# Patient Record
Sex: FEMALE | Race: BLACK OR AFRICAN AMERICAN | NOT HISPANIC OR LATINO
[De-identification: names, ages, dates, MRNs, and addresses within clinical notes are randomized per-mention and may not be internally consistent; named-entity substitution may affect disease eponyms.]

---

## 2017-08-24 PROBLEM — Z00.00 ENCOUNTER FOR PREVENTIVE HEALTH EXAMINATION: Status: ACTIVE | Noted: 2017-08-24

## 2017-08-29 PROBLEM — Z87.39 HISTORY OF OSTEOPOROSIS: Status: RESOLVED | Noted: 2017-08-29 | Resolved: 2017-08-29

## 2017-08-29 PROBLEM — Z86.79 HISTORY OF HYPERTENSION: Status: RESOLVED | Noted: 2017-08-29 | Resolved: 2017-08-29

## 2017-08-29 RX ORDER — MULTIVITAMIN
CAPSULE ORAL
Refills: 0 | Status: ACTIVE | COMMUNITY

## 2017-08-29 RX ORDER — ASPIRIN ENTERIC COATED TABLETS 81 MG 81 MG/1
81 TABLET, DELAYED RELEASE ORAL
Refills: 0 | Status: ACTIVE | COMMUNITY

## 2017-08-29 RX ORDER — AMLODIPINE BESYLATE 5 MG/1
5 TABLET ORAL
Refills: 0 | Status: ACTIVE | COMMUNITY

## 2017-08-29 RX ORDER — MULTIVIT-MIN/IRON/FOLIC ACID/K 18-600-40
400 CAPSULE ORAL
Refills: 0 | Status: ACTIVE | COMMUNITY

## 2017-09-05 ENCOUNTER — APPOINTMENT (OUTPATIENT)
Dept: HEMATOLOGY ONCOLOGY | Facility: CLINIC | Age: 67
End: 2017-09-05
Payer: MEDICARE

## 2017-09-05 VITALS
WEIGHT: 225 LBS | DIASTOLIC BLOOD PRESSURE: 66 MMHG | RESPIRATION RATE: 16 BRPM | TEMPERATURE: 98.5 F | HEIGHT: 65.94 IN | BODY MASS INDEX: 36.6 KG/M2 | HEART RATE: 57 BPM | OXYGEN SATURATION: 98 % | SYSTOLIC BLOOD PRESSURE: 159 MMHG

## 2017-09-05 DIAGNOSIS — Z86.79 PERSONAL HISTORY OF OTHER DISEASES OF THE CIRCULATORY SYSTEM: ICD-10-CM

## 2017-09-05 DIAGNOSIS — Z78.9 OTHER SPECIFIED HEALTH STATUS: ICD-10-CM

## 2017-09-05 DIAGNOSIS — Z80.3 FAMILY HISTORY OF MALIGNANT NEOPLASM OF BREAST: ICD-10-CM

## 2017-09-05 DIAGNOSIS — Z80.42 FAMILY HISTORY OF MALIGNANT NEOPLASM OF PROSTATE: ICD-10-CM

## 2017-09-05 DIAGNOSIS — Z87.09 PERSONAL HISTORY OF OTHER DISEASES OF THE RESPIRATORY SYSTEM: ICD-10-CM

## 2017-09-05 DIAGNOSIS — Z87.39 PERSONAL HISTORY OF OTHER DISEASES OF THE MUSCULOSKELETAL SYSTEM AND CONNECTIVE TISSUE: ICD-10-CM

## 2017-09-05 PROCEDURE — 99214 OFFICE O/P EST MOD 30 MIN: CPT

## 2017-09-05 RX ORDER — FLUTICASONE FUROATE AND VILANTEROL TRIFENATATE 200; 25 UG/1; UG/1
200-25 POWDER RESPIRATORY (INHALATION)
Refills: 0 | Status: ACTIVE | COMMUNITY
Start: 2017-09-05

## 2017-09-05 RX ORDER — ALBUTEROL SULFATE 108 UG/1
108 (90 BASE) AEROSOL, METERED RESPIRATORY (INHALATION)
Refills: 0 | Status: ACTIVE | COMMUNITY
Start: 2017-09-05

## 2017-09-11 ENCOUNTER — RX RENEWAL (OUTPATIENT)
Age: 67
End: 2017-09-11

## 2017-09-11 ENCOUNTER — OTHER (OUTPATIENT)
Age: 67
End: 2017-09-11

## 2017-10-25 ENCOUNTER — RX RENEWAL (OUTPATIENT)
Age: 67
End: 2017-10-25

## 2017-10-25 RX ORDER — ERGOCALCIFEROL 1.25 MG/1
1.25 MG CAPSULE, LIQUID FILLED ORAL
Qty: 6 | Refills: 0 | Status: ACTIVE | COMMUNITY
Start: 2017-09-11 | End: 1900-01-01

## 2018-07-22 PROBLEM — Z78.9 ALCOHOL USE: Status: ACTIVE | Noted: 2017-09-05

## 2018-08-01 ENCOUNTER — APPOINTMENT (OUTPATIENT)
Dept: HEMATOLOGY ONCOLOGY | Facility: CLINIC | Age: 68
End: 2018-08-01
Payer: MEDICARE

## 2018-08-01 VITALS
OXYGEN SATURATION: 99 % | WEIGHT: 217 LBS | DIASTOLIC BLOOD PRESSURE: 75 MMHG | RESPIRATION RATE: 16 BRPM | SYSTOLIC BLOOD PRESSURE: 140 MMHG | BODY MASS INDEX: 35.29 KG/M2 | HEIGHT: 65.94 IN | TEMPERATURE: 98.4 F | HEART RATE: 61 BPM

## 2018-08-01 PROCEDURE — 99214 OFFICE O/P EST MOD 30 MIN: CPT

## 2019-10-03 ENCOUNTER — APPOINTMENT (OUTPATIENT)
Dept: HEMATOLOGY ONCOLOGY | Facility: CLINIC | Age: 69
End: 2019-10-03
Payer: MEDICARE

## 2019-10-21 ENCOUNTER — RESULT REVIEW (OUTPATIENT)
Age: 69
End: 2019-10-21

## 2019-10-21 ENCOUNTER — APPOINTMENT (OUTPATIENT)
Dept: HEMATOLOGY ONCOLOGY | Facility: CLINIC | Age: 69
End: 2019-10-21
Payer: MEDICARE

## 2019-10-21 VITALS
OXYGEN SATURATION: 98 % | BODY MASS INDEX: 35.62 KG/M2 | WEIGHT: 218.99 LBS | TEMPERATURE: 98.6 F | HEART RATE: 57 BPM | RESPIRATION RATE: 16 BRPM | HEIGHT: 65.94 IN | SYSTOLIC BLOOD PRESSURE: 155 MMHG | DIASTOLIC BLOOD PRESSURE: 75 MMHG

## 2019-10-21 PROCEDURE — 99214 OFFICE O/P EST MOD 30 MIN: CPT

## 2019-10-21 NOTE — CONSULT LETTER
[Consult Letter:] : I had the pleasure of evaluating your patient, [unfilled]. [Dear  ___] : Dear  [unfilled], [Please see my note below.] : Please see my note below. [Consult Closing:] : Thank you very much for allowing me to participate in the care of this patient.  If you have any questions, please do not hesitate to contact me. [Sincerely,] : Sincerely, [FreeTextEntry3] : Mary Evans MD\par [facility] [place] at [facility]\par

## 2019-10-21 NOTE — ADDENDUM
[FreeTextEntry1] : Called patient to report that vitamin D level was low at 19. She was advised to take vitamin D replacement dose 50,000 units weekly x6 weeks and then go back on maintenance dose of 2000 units daily.

## 2019-10-21 NOTE — REVIEW OF SYSTEMS
[Cough] : cough [Joint Pain] : joint pain [Negative] : Allergic/Immunologic [Fever] : no fever [Eye Pain] : no eye pain [Vision Problems] : no vision problems [Night Sweats] : no night sweats [Palpitations] : no palpitations [Hoarseness] : no hoarseness [Dysphagia] : no dysphagia [Shortness Of Breath] : no shortness of breath [Lower Ext Edema] : no lower extremity edema [Constipation] : no constipation [Diarrhea] : no diarrhea [Abdominal Pain] : no abdominal pain [Dysuria] : no dysuria [Skin Rash] : no skin rash [Dysmenorrhea/Abn Vaginal Bleeding] : no dysmenorrhea/abnormal vaginal bleeding [Muscle Pain] : no muscle pain [Dizziness] : no dizziness [Confused] : no confusion [Anxiety] : no anxiety [Hot Flashes] : no hot flashes [Depression] : no depression [Muscle Weakness] : no muscle weakness [Easy Bleeding] : no tendency for easy bleeding [Easy Bruising] : no tendency for easy bruising [FreeTextEntry2] : regained weight. [FreeTextEntry6] : Asthmatic

## 2019-10-21 NOTE — HISTORY OF PRESENT ILLNESS
[de-identified] : Patient is a 67 year old female with a h/o of left breast cancer. S/P lumpectomy and RT diagnosed in 2004.  She has had nonspecific elevation of her ESR and CRP. [de-identified] : She presents for routine follow up, with h/o breast cancer dx 2003 without recurrence. She continues to have an elevated  ESR in the absence of symptoms with negative workup. Bone density in 2016 was normal and recent mammogram was negative. There are no recent medical issues except recent asthmatic cough returning in Spring. Workup by pulmonology was negative and she was restarted on inhalers.

## 2019-10-21 NOTE — ASSESSMENT
[FreeTextEntry1] : 1. Left bca - 2004 - s/p lumpectomy, RT, chemo - triple negative. \par bone density 8/22/18 - WNL \par Mammogram - due now \par \par 2. Elevated ESR/ CRP - repeat parameters- w/u for malignancy negative. Differential diagnosis includes vasculitis- no tx for now.\par \par 3. Obesity - low fat diet, low carb diet \par \par 4.  Vit D deficiency - take 5 k a day

## 2020-10-20 ENCOUNTER — APPOINTMENT (OUTPATIENT)
Dept: HEMATOLOGY ONCOLOGY | Facility: CLINIC | Age: 70
End: 2020-10-20

## 2020-12-24 ENCOUNTER — RESULT REVIEW (OUTPATIENT)
Age: 70
End: 2020-12-24

## 2020-12-24 ENCOUNTER — APPOINTMENT (OUTPATIENT)
Dept: HEMATOLOGY ONCOLOGY | Facility: CLINIC | Age: 70
End: 2020-12-24
Payer: MEDICARE

## 2020-12-24 VITALS
WEIGHT: 220 LBS | TEMPERATURE: 97.7 F | HEIGHT: 65.94 IN | DIASTOLIC BLOOD PRESSURE: 77 MMHG | HEART RATE: 65 BPM | SYSTOLIC BLOOD PRESSURE: 136 MMHG | BODY MASS INDEX: 35.78 KG/M2 | OXYGEN SATURATION: 100 % | RESPIRATION RATE: 20 BRPM

## 2020-12-24 PROCEDURE — 99214 OFFICE O/P EST MOD 30 MIN: CPT

## 2020-12-24 NOTE — HISTORY OF PRESENT ILLNESS
[de-identified] : Patient is a 67 year old female with a h/o of left breast cancer. S/P lumpectomy and RT diagnosed in 2004.  She has had nonspecific elevation of her ESR and CRP. [de-identified] : She presents for routine follow up, with h/o breast cancer dx 2003 without recurrence. She continues to have an elevated  ESR in the absence of symptoms with negative workup. Bone density in 2016 was normal and recent mammogram was negative. There are no recent medical issues except recent asthmatic cough returning in Spring. Workup by pulmonology was negative and she was restarted on inhalers.

## 2020-12-24 NOTE — CONSULT LETTER
[Dear  ___] : Dear  [unfilled], [Consult Letter:] : I had the pleasure of evaluating your patient, [unfilled]. [Please see my note below.] : Please see my note below. [Consult Closing:] : Thank you very much for allowing me to participate in the care of this patient.  If you have any questions, please do not hesitate to contact me. [Sincerely,] : Sincerely, [FreeTextEntry3] : Mary Evans MD\par [facility] [place] at [facility]\par

## 2020-12-24 NOTE — ASSESSMENT
[FreeTextEntry1] : 1. Left bca - 2004 - s/p lumpectomy, RT, chemo - triple negative. \par bone density 8/22/18 - WNL \par Mammogram - due now, fatty breast - mammogram only\par Dr. Mansfield- PCP \par \par 2. Elevated ESR/ CRP - repeat parameters- w/u for malignancy negative. Differential diagnosis includes vasculitis- no tx for now.\par \par 3. Obesity - low fat diet, low carb diet \par \par 4.  Vit D deficiency - take 5 k a day

## 2020-12-24 NOTE — REVIEW OF SYSTEMS
[Cough] : cough [Joint Pain] : joint pain [Negative] : Allergic/Immunologic [Fever] : no fever [Night Sweats] : no night sweats [Eye Pain] : no eye pain [Vision Problems] : no vision problems [Dysphagia] : no dysphagia [Hoarseness] : no hoarseness [Palpitations] : no palpitations [Lower Ext Edema] : no lower extremity edema [Shortness Of Breath] : no shortness of breath [Abdominal Pain] : no abdominal pain [Constipation] : no constipation [Diarrhea] : no diarrhea [Dysuria] : no dysuria [Dysmenorrhea/Abn Vaginal Bleeding] : no dysmenorrhea/abnormal vaginal bleeding [Muscle Pain] : no muscle pain [Skin Rash] : no skin rash [Confused] : no confusion [Dizziness] : no dizziness [Anxiety] : no anxiety [Depression] : no depression [Hot Flashes] : no hot flashes [Muscle Weakness] : no muscle weakness [Easy Bleeding] : no tendency for easy bleeding [Easy Bruising] : no tendency for easy bruising [FreeTextEntry2] : regained weight. [FreeTextEntry6] : Asthmatic

## 2021-11-15 ENCOUNTER — APPOINTMENT (OUTPATIENT)
Dept: HEMATOLOGY ONCOLOGY | Facility: CLINIC | Age: 71
End: 2021-11-15
Payer: MEDICARE

## 2021-11-15 ENCOUNTER — RESULT REVIEW (OUTPATIENT)
Age: 71
End: 2021-11-15

## 2021-11-15 VITALS
HEIGHT: 65.94 IN | DIASTOLIC BLOOD PRESSURE: 77 MMHG | TEMPERATURE: 96.9 F | HEART RATE: 73 BPM | WEIGHT: 216.3 LBS | SYSTOLIC BLOOD PRESSURE: 154 MMHG | OXYGEN SATURATION: 100 % | RESPIRATION RATE: 18 BRPM | BODY MASS INDEX: 35.18 KG/M2

## 2021-11-15 PROCEDURE — 99214 OFFICE O/P EST MOD 30 MIN: CPT | Mod: 25

## 2021-11-15 PROCEDURE — 36415 COLL VENOUS BLD VENIPUNCTURE: CPT

## 2021-11-15 NOTE — HISTORY OF PRESENT ILLNESS
[de-identified] : Patient is a 67 year old female with a h/o of left breast cancer. S/P lumpectomy and RT diagnosed in 2004.  She has had nonspecific elevation of her ESR and CRP. [de-identified] : She presents for routine follow up, with h/o breast cancer dx 2003 without recurrence. She continues to have an elevated  ESR in the absence of symptoms with negative workup. Bone density in 2016 was normal and recent mammogram was negative. There are no recent medical issues except recent asthmatic cough returning in Spring. Workup by pulmonology was negative and she was restarted on inhalers.

## 2021-11-15 NOTE — ASSESSMENT
[FreeTextEntry1] : 1. Left bca - 2004 - s/p lumpectomy, RT, chemo - triple negative. \par bone density 8/22/18 - WNL \par Mammogram - due now, fatty breast - mammogram only\par Dr. Mansfield- PCP \par \par 2. Elevated ESR/ CRP - repeat parameters- w/u for malignancy negative. Differential diagnosis includes vasculitis- no tx for now.\par \par 3. Obesity - low fat diet, low carb diet \par \par 4.  Vit D deficiency - take 5 k a day \par \par RTC 5 years

## 2021-11-15 NOTE — CONSULT LETTER
[Dear  ___] : Dear  [unfilled], [Consult Letter:] : I had the pleasure of evaluating your patient, [unfilled]. [Please see my note below.] : Please see my note below. [Consult Closing:] : Thank you very much for allowing me to participate in the care of this patient.  If you have any questions, please do not hesitate to contact me. [Sincerely,] : Sincerely, [FreeTextEntry3] : Mary Evans MD\par [facility] [place] at [facility]\par

## 2021-12-16 ENCOUNTER — APPOINTMENT (OUTPATIENT)
Dept: HEMATOLOGY ONCOLOGY | Facility: CLINIC | Age: 71
End: 2021-12-16

## 2022-11-10 NOTE — OB HISTORY
Living: [unfilled]

## 2022-11-14 ENCOUNTER — RESULT REVIEW (OUTPATIENT)
Age: 72
End: 2022-11-14

## 2022-11-14 ENCOUNTER — APPOINTMENT (OUTPATIENT)
Dept: HEMATOLOGY ONCOLOGY | Facility: CLINIC | Age: 72
End: 2022-11-14

## 2022-11-14 VITALS
TEMPERATURE: 96.9 F | HEIGHT: 65.94 IN | SYSTOLIC BLOOD PRESSURE: 124 MMHG | DIASTOLIC BLOOD PRESSURE: 67 MMHG | WEIGHT: 216.19 LBS | OXYGEN SATURATION: 95 % | HEART RATE: 72 BPM | RESPIRATION RATE: 18 BRPM | BODY MASS INDEX: 35.16 KG/M2

## 2022-11-14 DIAGNOSIS — D64.9 ANEMIA, UNSPECIFIED: ICD-10-CM

## 2022-11-14 DIAGNOSIS — R79.82 ELEVATED C-REACTIVE PROTEIN (CRP): ICD-10-CM

## 2022-11-14 PROCEDURE — 99214 OFFICE O/P EST MOD 30 MIN: CPT | Mod: 25

## 2022-11-14 PROCEDURE — 36415 COLL VENOUS BLD VENIPUNCTURE: CPT

## 2022-11-14 RX ORDER — BUPROPION HYDROCHLORIDE 300 MG/1
300 TABLET, EXTENDED RELEASE ORAL
Qty: 90 | Refills: 0 | Status: ACTIVE | COMMUNITY
Start: 2022-05-24

## 2022-11-14 RX ORDER — PREDNISONE 20 MG/1
20 TABLET ORAL
Qty: 5 | Refills: 0 | Status: ACTIVE | COMMUNITY
Start: 2022-10-31

## 2022-11-14 RX ORDER — VALSARTAN 320 MG/1
320 TABLET, COATED ORAL
Qty: 90 | Refills: 0 | Status: ACTIVE | COMMUNITY
Start: 2022-03-17

## 2022-11-14 RX ORDER — AMLODIPINE BESYLATE 10 MG/1
10 TABLET ORAL
Qty: 90 | Refills: 0 | Status: ACTIVE | COMMUNITY
Start: 2022-01-26

## 2022-11-14 RX ORDER — DESLORATADINE 5 MG/1
5 TABLET ORAL
Qty: 90 | Refills: 0 | Status: ACTIVE | COMMUNITY
Start: 2022-08-03

## 2022-11-14 NOTE — CONSULT LETTER
[Dear  ___] : Dear  [unfilled], [Consult Letter:] : I had the pleasure of evaluating your patient, [unfilled]. [Please see my note below.] : Please see my note below. [Consult Closing:] : Thank you very much for allowing me to participate in the care of this patient.  If you have any questions, please do not hesitate to contact me. [Sincerely,] : Sincerely, [FreeTextEntry3] : Mary Evans MD\par [facility] [place] at [facility]\par

## 2022-11-14 NOTE — ASSESSMENT
[FreeTextEntry1] : 1. Left bca - 2004 - s/p lumpectomy, RT, chemo - triple negative. \par bone density 8/22/18 - WNL \par Mammogram - due now, fatty breast - mammogram only, due now \par Dr. Mansfield- PCP \par \par 2. Elevated ESR/ CRP - repeat parameters- w/u for malignancy negative. Differential diagnosis includes vasculitis- no tx for now.  Visual changes- new, seeing scribbles on the walls, vasculitis? temporal arteritis? Decreased inflammatory parameters with steroids but no change in vision.  Steroids given for idiopathic urticaria by allergologist, no skin biopsy done. \par \par 3. Obesity - low fat diet, low carb diet \par \par 4.  Vit D deficiency - take 5 k a day \par \par

## 2022-11-14 NOTE — HISTORY OF PRESENT ILLNESS
[de-identified] : Patient is a 67 year old female with a h/o of left breast cancer. S/P lumpectomy and RT diagnosed in 2004.  She has had nonspecific elevation of her ESR and CRP. [de-identified] : She presents for routine follow up, with h/o breast cancer dx 2003 without recurrence. She continues to have an elevated  ESR in the absence of symptoms with negative workup. Bone density in 2016 was normal and recent mammogram was negative. There are no recent medical issues except recent asthmatic cough returning in Spring. Workup by pulmonology was negative and she was restarted on inhalers.

## 2023-12-28 ENCOUNTER — APPOINTMENT (OUTPATIENT)
Dept: HEMATOLOGY ONCOLOGY | Facility: CLINIC | Age: 73
End: 2023-12-28
Payer: MEDICARE

## 2023-12-28 ENCOUNTER — RESULT REVIEW (OUTPATIENT)
Age: 73
End: 2023-12-28

## 2023-12-28 VITALS
BODY MASS INDEX: 33.18 KG/M2 | TEMPERATURE: 97.5 F | HEIGHT: 65.94 IN | SYSTOLIC BLOOD PRESSURE: 128 MMHG | RESPIRATION RATE: 16 BRPM | WEIGHT: 204 LBS | DIASTOLIC BLOOD PRESSURE: 68 MMHG | HEART RATE: 62 BPM | OXYGEN SATURATION: 97 %

## 2023-12-28 DIAGNOSIS — E55.9 VITAMIN D DEFICIENCY, UNSPECIFIED: ICD-10-CM

## 2023-12-28 DIAGNOSIS — C50.919 MALIGNANT NEOPLASM OF UNSPECIFIED SITE OF UNSPECIFIED FEMALE BREAST: ICD-10-CM

## 2023-12-28 DIAGNOSIS — R70.0 ELEVATED ERYTHROCYTE SEDIMENTATION RATE: ICD-10-CM

## 2023-12-28 PROCEDURE — 36415 COLL VENOUS BLD VENIPUNCTURE: CPT

## 2023-12-28 PROCEDURE — 99213 OFFICE O/P EST LOW 20 MIN: CPT

## 2023-12-28 NOTE — CONSULT LETTER
[Dear  ___] : Dear  [unfilled], [Consult Letter:] : I had the pleasure of evaluating your patient, [unfilled]. [Please see my note below.] : Please see my note below. [Consult Closing:] : Thank you very much for allowing me to participate in the care of this patient.  If you have any questions, please do not hesitate to contact me. [Sincerely,] : Sincerely, [FreeTextEntry3] : Mary Evans MD\par  [facility] [place] at [facility]\par

## 2023-12-28 NOTE — REVIEW OF SYSTEMS
[Cough] : cough [Joint Pain] : joint pain [Negative] : Allergic/Immunologic [Fever] : no fever [Night Sweats] : no night sweats [Eye Pain] : no eye pain [Vision Problems] : no vision problems [Dysphagia] : no dysphagia [Hoarseness] : no hoarseness [Palpitations] : no palpitations [Lower Ext Edema] : no lower extremity edema [Shortness Of Breath] : no shortness of breath [Abdominal Pain] : no abdominal pain [Constipation] : no constipation [Dysuria] : no dysuria [Dysmenorrhea/Abn Vaginal Bleeding] : no dysmenorrhea/abnormal vaginal bleeding [Muscle Pain] : no muscle pain [Skin Rash] : no skin rash [Confused] : no confusion [Dizziness] : no dizziness [Anxiety] : no anxiety [Depression] : no depression [Hot Flashes] : no hot flashes [Muscle Weakness] : no muscle weakness [Easy Bleeding] : no tendency for easy bleeding [Easy Bruising] : no tendency for easy bruising [FreeTextEntry2] : regained weight. [FreeTextEntry6] : Asthmatic

## 2023-12-28 NOTE — ASSESSMENT
[FreeTextEntry1] : 1. Left bca - 2004 - s/p lumpectomy, RT, chemo - triple negative. bone density 8/22/18 - WNL Mammogram - due now, fatty breast - mammogram only, due now Dr. Mansfield- PCP  2. Elevated ESR/ CRP - repeat parameters- w/u for malignancy negative. Differential diagnosis includes vasculitis- no tx for now. Visual changes- new, seeing scribbles on the walls, vasculitis? temporal arteritis? Decreased inflammatory parameters with steroids but no change in vision. Steroids given for idiopathic urticaria by allergologist, no skin biopsy done.  3. Obesity - low fat diet, low carb diet  4. Vit D deficiency - take 5 k a day

## 2023-12-28 NOTE — HISTORY OF PRESENT ILLNESS
[de-identified] : Patient is a 67 year old female with a h/o of left breast cancer. S/P lumpectomy and RT diagnosed in 2004.  She has had nonspecific elevation of her ESR and CRP. [de-identified] : She presents for routine follow up, with h/o breast cancer dx 2003 without recurrence. She continues to have an elevated  ESR in the absence of symptoms with negative workup. Bone density in 2016 was normal and recent mammogram was negative. There are no recent medical issues except recent asthmatic cough returning in Spring. Workup by pulmonology was negative and she was restarted on inhalers.

## 2024-06-20 NOTE — HISTORY OF PRESENT ILLNESS
[de-identified] : Patient is a 67 year old female with a h/o of left breast cancer. S/P lumpectomy and RT diagnosed in 2004.  She has had nonspecific elevation of her ESR and CRP. [de-identified] : She presents for routine follow up, with h/o breast cancer dx 2003 without recurrence. She continues to have an elevated  ESR in the absence of symptoms with negative workup. Bone density in 2016 was normal and recent mammogram was negative. There are no recent medical issues except recent asthmatic cough returning in Spring. Workup by pulmonology was negative and she was restarted on inhalers.

## 2024-06-20 NOTE — CONSULT LETTER
[Dear  ___] : Dear  [unfilled], [Consult Letter:] : I had the pleasure of evaluating your patient, [unfilled]. [Please see my note below.] : Please see my note below. [Consult Closing:] : Thank you very much for allowing me to participate in the care of this patient.  If you have any questions, please do not hesitate to contact me. [Sincerely,] : Sincerely, [FreeTextEntry3] : Mary Evans MD\par  [facility] [place] at [facility]\par

## 2024-06-20 NOTE — PHYSICAL EXAM
Spoke with patient about the importance of limiting sugar and carbohydrates in her diet to prevent diabetes. [Fully active, able to carry on all pre-disease performance without restriction] : Status 0 - Fully active, able to carry on all pre-disease performance without restriction [Normal] : affect appropriate

## 2024-06-20 NOTE — REVIEW OF SYSTEMS
[Fever] : no fever [Night Sweats] : no night sweats [Eye Pain] : no eye pain [Vision Problems] : no vision problems [Dysphagia] : no dysphagia [Hoarseness] : no hoarseness [Palpitations] : no palpitations [Lower Ext Edema] : no lower extremity edema [Shortness Of Breath] : no shortness of breath [Cough] : cough [Abdominal Pain] : no abdominal pain [Constipation] : no constipation [Dysuria] : no dysuria [Dysmenorrhea/Abn Vaginal Bleeding] : no dysmenorrhea/abnormal vaginal bleeding [Joint Pain] : joint pain [Muscle Pain] : no muscle pain [Skin Rash] : no skin rash [Confused] : no confusion [Dizziness] : no dizziness [Anxiety] : no anxiety [Depression] : no depression [Hot Flashes] : no hot flashes [Muscle Weakness] : no muscle weakness [Easy Bleeding] : no tendency for easy bleeding [Easy Bruising] : no tendency for easy bruising [Negative] : Allergic/Immunologic [FreeTextEntry2] : regained weight. [FreeTextEntry6] : Asthmatic

## 2024-06-24 ENCOUNTER — APPOINTMENT (OUTPATIENT)
Dept: HEMATOLOGY ONCOLOGY | Facility: CLINIC | Age: 74
End: 2024-06-24

## 2024-12-24 PROBLEM — F10.90 ALCOHOL USE: Status: ACTIVE | Noted: 2017-09-05

## 2024-12-30 ENCOUNTER — APPOINTMENT (OUTPATIENT)
Dept: HEMATOLOGY ONCOLOGY | Facility: CLINIC | Age: 74
End: 2024-12-30
Payer: MEDICARE

## 2024-12-30 ENCOUNTER — RESULT REVIEW (OUTPATIENT)
Age: 74
End: 2024-12-30

## 2024-12-30 VITALS
WEIGHT: 207.13 LBS | SYSTOLIC BLOOD PRESSURE: 135 MMHG | BODY MASS INDEX: 33.69 KG/M2 | RESPIRATION RATE: 16 BRPM | OXYGEN SATURATION: 98 % | TEMPERATURE: 97.3 F | HEART RATE: 70 BPM | HEIGHT: 65.94 IN | DIASTOLIC BLOOD PRESSURE: 63 MMHG

## 2024-12-30 DIAGNOSIS — R70.0 ELEVATED ERYTHROCYTE SEDIMENTATION RATE: ICD-10-CM

## 2024-12-30 DIAGNOSIS — E55.9 VITAMIN D DEFICIENCY, UNSPECIFIED: ICD-10-CM

## 2024-12-30 DIAGNOSIS — C50.919 MALIGNANT NEOPLASM OF UNSPECIFIED SITE OF UNSPECIFIED FEMALE BREAST: ICD-10-CM

## 2024-12-30 DIAGNOSIS — D64.9 ANEMIA, UNSPECIFIED: ICD-10-CM

## 2024-12-30 PROCEDURE — 99214 OFFICE O/P EST MOD 30 MIN: CPT

## 2024-12-30 PROCEDURE — 36415 COLL VENOUS BLD VENIPUNCTURE: CPT
